# Patient Record
Sex: MALE
[De-identification: names, ages, dates, MRNs, and addresses within clinical notes are randomized per-mention and may not be internally consistent; named-entity substitution may affect disease eponyms.]

---

## 2023-03-01 ENCOUNTER — NON-APPOINTMENT (OUTPATIENT)
Age: 63
End: 2023-03-01

## 2023-03-01 PROBLEM — Z00.00 ENCOUNTER FOR PREVENTIVE HEALTH EXAMINATION: Status: ACTIVE | Noted: 2023-03-01

## 2023-03-10 ENCOUNTER — APPOINTMENT (OUTPATIENT)
Dept: OTOLARYNGOLOGY | Facility: CLINIC | Age: 63
End: 2023-03-10
Payer: COMMERCIAL

## 2023-03-10 VITALS — WEIGHT: 300 LBS | HEIGHT: 72 IN | BODY MASS INDEX: 40.63 KG/M2

## 2023-03-10 DIAGNOSIS — Z87.09 PERSONAL HISTORY OF OTHER DISEASES OF THE RESPIRATORY SYSTEM: ICD-10-CM

## 2023-03-10 DIAGNOSIS — Z82.3 FAMILY HISTORY OF STROKE: ICD-10-CM

## 2023-03-10 DIAGNOSIS — Z86.39 PERSONAL HISTORY OF OTHER ENDOCRINE, NUTRITIONAL AND METABOLIC DISEASE: ICD-10-CM

## 2023-03-10 DIAGNOSIS — H93.13 TINNITUS, BILATERAL: ICD-10-CM

## 2023-03-10 DIAGNOSIS — Z83.3 FAMILY HISTORY OF DIABETES MELLITUS: ICD-10-CM

## 2023-03-10 DIAGNOSIS — Z78.9 OTHER SPECIFIED HEALTH STATUS: ICD-10-CM

## 2023-03-10 DIAGNOSIS — G47.33 OBSTRUCTIVE SLEEP APNEA (ADULT) (PEDIATRIC): ICD-10-CM

## 2023-03-10 DIAGNOSIS — H90.3 SENSORINEURAL HEARING LOSS, BILATERAL: ICD-10-CM

## 2023-03-10 PROCEDURE — 99203 OFFICE O/P NEW LOW 30 MIN: CPT

## 2023-03-10 PROCEDURE — 92557 COMPREHENSIVE HEARING TEST: CPT

## 2023-03-10 PROCEDURE — 92567 TYMPANOMETRY: CPT

## 2023-03-10 RX ORDER — ATENOLOL 50 MG/1
TABLET ORAL
Refills: 0 | Status: ACTIVE | COMMUNITY

## 2023-03-10 RX ORDER — DIGOXIN 0.06 MG/1
TABLET ORAL
Refills: 0 | Status: ACTIVE | COMMUNITY

## 2023-03-10 NOTE — CONSULT LETTER
[Dear  ___] : Dear  [unfilled], [Consult Letter:] : I had the pleasure of evaluating your patient, [unfilled]. [Please see my note below.] : Please see my note below. [Consult Closing:] : Thank you very much for allowing me to participate in the care of this patient.  If you have any questions, please do not hesitate to contact me. [Sincerely,] : Sincerely, [FreeTextEntry3] : Kathy Cohen MD\par

## 2023-03-10 NOTE — ASSESSMENT
[FreeTextEntry1] : He has a history of tinnitus.  Audiogram showed high-frequency sensorineural hearing loss above 4000 Hz\par \par He has difficulty breathing while laying down and likely suffers from obstructive sleep apnea\par \par PLAN\par \par - findings and management options discussed with the patient. \par - Good aural hygiene.\par - noise precautions\par - repeat audiogram in 1 year\par - literature regarding tinnitus given\par - Avoid substances that can make tinnitus worse.   They should check any medications they take to see tinnitus is side effect\par - They may use a white noise maker or leave the tv/radio on when it is quiet \par - Treatment options such as hearing aids, and tinnitus therapy were discussed\par -I recommended weight loss.  I asked him to avoid sleeping on his back, eating before bed, and drinking alcohol\par -I recommended evaluation at the sleep center.  He said he would like to find a place closer to home in [place].\par - follow up in 1 year if doing well\par - call and return earlier if any concerns

## 2023-03-10 NOTE — HISTORY OF PRESENT ILLNESS
[de-identified] : NATALI ALMANZA is a 62 year old patient with a 3-year history of bilateral tinnitus.  He does not recall a preceding event.  He has no otalgia, otorrhea, or dizziness.  He denies a history of recurrent middle ear infections, prior otologic surgery, ear trauma, noise exposure, or family history of hearing loss.  He has not had a recent audiogram.\par \par He also likely has obstructive sleep apnea.  He has difficulty sleeping sleeping at night while laying down.  He has not had a sleep study.  He is obese